# Patient Record
Sex: FEMALE | Race: WHITE | Employment: OTHER | URBAN - NONMETROPOLITAN AREA
[De-identification: names, ages, dates, MRNs, and addresses within clinical notes are randomized per-mention and may not be internally consistent; named-entity substitution may affect disease eponyms.]

---

## 2017-07-25 ENCOUNTER — OFFICE VISIT (OUTPATIENT)
Dept: CARDIOLOGY | Age: 72
End: 2017-07-25
Payer: MEDICARE

## 2017-07-25 VITALS
SYSTOLIC BLOOD PRESSURE: 128 MMHG | WEIGHT: 94.4 LBS | BODY MASS INDEX: 16.73 KG/M2 | DIASTOLIC BLOOD PRESSURE: 62 MMHG | HEART RATE: 62 BPM | HEIGHT: 63 IN

## 2017-07-25 DIAGNOSIS — I07.1 MODERATE TRICUSPID INSUFFICIENCY: ICD-10-CM

## 2017-07-25 DIAGNOSIS — I34.0 MILD MITRAL REGURGITATION: ICD-10-CM

## 2017-07-25 DIAGNOSIS — R00.1 BRADYCARDIA: Primary | ICD-10-CM

## 2017-07-25 PROCEDURE — 3017F COLORECTAL CA SCREEN DOC REV: CPT | Performed by: INTERNAL MEDICINE

## 2017-07-25 PROCEDURE — 3014F SCREEN MAMMO DOC REV: CPT | Performed by: INTERNAL MEDICINE

## 2017-07-25 PROCEDURE — G8400 PT W/DXA NO RESULTS DOC: HCPCS | Performed by: INTERNAL MEDICINE

## 2017-07-25 PROCEDURE — G8427 DOCREV CUR MEDS BY ELIG CLIN: HCPCS | Performed by: INTERNAL MEDICINE

## 2017-07-25 PROCEDURE — 99213 OFFICE O/P EST LOW 20 MIN: CPT | Performed by: INTERNAL MEDICINE

## 2017-07-25 PROCEDURE — G8419 CALC BMI OUT NRM PARAM NOF/U: HCPCS | Performed by: INTERNAL MEDICINE

## 2017-07-25 PROCEDURE — 93000 ELECTROCARDIOGRAM COMPLETE: CPT | Performed by: INTERNAL MEDICINE

## 2017-07-25 PROCEDURE — 1090F PRES/ABSN URINE INCON ASSESS: CPT | Performed by: INTERNAL MEDICINE

## 2017-07-25 PROCEDURE — 1036F TOBACCO NON-USER: CPT | Performed by: INTERNAL MEDICINE

## 2017-07-25 PROCEDURE — 4040F PNEUMOC VAC/ADMIN/RCVD: CPT | Performed by: INTERNAL MEDICINE

## 2017-07-25 PROCEDURE — 1123F ACP DISCUSS/DSCN MKR DOCD: CPT | Performed by: INTERNAL MEDICINE

## 2017-09-27 ENCOUNTER — TELEPHONE (OUTPATIENT)
Dept: ADMINISTRATIVE | Age: 72
End: 2017-09-27

## 2017-09-29 ENCOUNTER — TELEPHONE (OUTPATIENT)
Dept: ADMINISTRATIVE | Age: 72
End: 2017-09-29

## 2018-07-24 ENCOUNTER — OFFICE VISIT (OUTPATIENT)
Dept: CARDIOLOGY | Age: 73
End: 2018-07-24
Payer: MEDICARE

## 2018-07-24 VITALS
HEART RATE: 57 BPM | BODY MASS INDEX: 16.25 KG/M2 | DIASTOLIC BLOOD PRESSURE: 80 MMHG | HEIGHT: 64 IN | SYSTOLIC BLOOD PRESSURE: 140 MMHG | WEIGHT: 95.2 LBS

## 2018-07-24 DIAGNOSIS — I34.0 MILD MITRAL REGURGITATION: ICD-10-CM

## 2018-07-24 DIAGNOSIS — R94.31 ABNORMAL ECG: ICD-10-CM

## 2018-07-24 DIAGNOSIS — I07.1 MODERATE TRICUSPID INSUFFICIENCY: Primary | ICD-10-CM

## 2018-07-24 PROCEDURE — 93000 ELECTROCARDIOGRAM COMPLETE: CPT | Performed by: INTERNAL MEDICINE

## 2018-07-24 PROCEDURE — 99213 OFFICE O/P EST LOW 20 MIN: CPT | Performed by: INTERNAL MEDICINE

## 2018-07-24 NOTE — PROGRESS NOTES
effusion. Assessment and Plan:     -Patient remains asymptomatic. Patient has abnormal ECG. I recommended stress test for ischemia evaluation. Patient does not want to have stress test. Counseled toward symptoms for which she will have to seek emergent medical attention. -TTE 6/2016 showed mild MR, mild to moderate TR with RVSP 45 mm Hg.   -Chronic neck and lower back pain.   -RTC 12 months

## 2019-08-19 ENCOUNTER — OFFICE VISIT (OUTPATIENT)
Dept: CARDIOLOGY | Age: 74
End: 2019-08-19
Payer: MEDICARE

## 2019-08-19 VITALS
BODY MASS INDEX: 16.13 KG/M2 | HEART RATE: 75 BPM | WEIGHT: 94.5 LBS | SYSTOLIC BLOOD PRESSURE: 120 MMHG | OXYGEN SATURATION: 98 % | DIASTOLIC BLOOD PRESSURE: 78 MMHG | HEIGHT: 64 IN

## 2019-08-19 DIAGNOSIS — I07.1 MODERATE TRICUSPID INSUFFICIENCY: Primary | ICD-10-CM

## 2019-08-19 PROCEDURE — 1123F ACP DISCUSS/DSCN MKR DOCD: CPT | Performed by: INTERNAL MEDICINE

## 2019-08-19 PROCEDURE — 93000 ELECTROCARDIOGRAM COMPLETE: CPT | Performed by: INTERNAL MEDICINE

## 2019-08-19 PROCEDURE — G8427 DOCREV CUR MEDS BY ELIG CLIN: HCPCS | Performed by: INTERNAL MEDICINE

## 2019-08-19 PROCEDURE — 99214 OFFICE O/P EST MOD 30 MIN: CPT | Performed by: INTERNAL MEDICINE

## 2019-08-19 PROCEDURE — 1036F TOBACCO NON-USER: CPT | Performed by: INTERNAL MEDICINE

## 2019-08-19 PROCEDURE — G8419 CALC BMI OUT NRM PARAM NOF/U: HCPCS | Performed by: INTERNAL MEDICINE

## 2019-08-19 PROCEDURE — G8400 PT W/DXA NO RESULTS DOC: HCPCS | Performed by: INTERNAL MEDICINE

## 2019-08-19 PROCEDURE — 1090F PRES/ABSN URINE INCON ASSESS: CPT | Performed by: INTERNAL MEDICINE

## 2019-08-19 PROCEDURE — 4040F PNEUMOC VAC/ADMIN/RCVD: CPT | Performed by: INTERNAL MEDICINE

## 2020-08-17 ENCOUNTER — OFFICE VISIT (OUTPATIENT)
Dept: CARDIOLOGY | Age: 75
End: 2020-08-17
Payer: MEDICARE

## 2020-08-17 VITALS
BODY MASS INDEX: 16.22 KG/M2 | HEART RATE: 67 BPM | SYSTOLIC BLOOD PRESSURE: 150 MMHG | HEIGHT: 64 IN | DIASTOLIC BLOOD PRESSURE: 70 MMHG | WEIGHT: 95 LBS

## 2020-08-17 PROCEDURE — 1036F TOBACCO NON-USER: CPT | Performed by: INTERNAL MEDICINE

## 2020-08-17 PROCEDURE — G8427 DOCREV CUR MEDS BY ELIG CLIN: HCPCS | Performed by: INTERNAL MEDICINE

## 2020-08-17 PROCEDURE — 1123F ACP DISCUSS/DSCN MKR DOCD: CPT | Performed by: INTERNAL MEDICINE

## 2020-08-17 PROCEDURE — G8400 PT W/DXA NO RESULTS DOC: HCPCS | Performed by: INTERNAL MEDICINE

## 2020-08-17 PROCEDURE — G8419 CALC BMI OUT NRM PARAM NOF/U: HCPCS | Performed by: INTERNAL MEDICINE

## 2020-08-17 PROCEDURE — 93010 ELECTROCARDIOGRAM REPORT: CPT | Performed by: INTERNAL MEDICINE

## 2020-08-17 PROCEDURE — 1090F PRES/ABSN URINE INCON ASSESS: CPT | Performed by: INTERNAL MEDICINE

## 2020-08-17 PROCEDURE — 99214 OFFICE O/P EST MOD 30 MIN: CPT | Performed by: INTERNAL MEDICINE

## 2020-08-17 PROCEDURE — 93005 ELECTROCARDIOGRAM TRACING: CPT | Performed by: INTERNAL MEDICINE

## 2020-08-17 PROCEDURE — 99214 OFFICE O/P EST MOD 30 MIN: CPT

## 2020-08-17 PROCEDURE — 4040F PNEUMOC VAC/ADMIN/RCVD: CPT | Performed by: INTERNAL MEDICINE

## 2020-08-17 RX ORDER — MAGNESIUM OXIDE 400 MG/1
400 TABLET ORAL DAILY
COMMUNITY

## 2020-08-17 NOTE — PROGRESS NOTES
[facility]    CC: follow up for 1st AVB    HPI:  Macho Del Valle  Is here for follow up. At last office visit we were following for 1st AVB. Admits to occasional hard beat \"rocking heart\", resolves with relaxation. Denies any cp, sob, orthopnea, pnd, le edema, dizziness/lightheadedness. Remains to have severe hypothyroidism, not on any medication per patient choice. Past Medical History:   Diagnosis Date    Cancer [facility]) 2003    Basal cell carcinoma    Osteoarthritis     Personal history of kidney stones     Multiple kidney stone [id number]       Past Surgical History:   Procedure Laterality Date    COLONOSCOPY  1986    COLONOSCOPY  1989   [address], [id number]   [address]  1964, 1965, 1969    LITHOTRIPSY Bilateral 1997    TONSILLECTOMY AND ADENOIDECTOMY  Child    TUBAL LIGATION  1968       Family History   Problem Relation Age of Onset    Arthritis Mother     Depression Mother     High Blood Pressure Mother     Heart Disease Father     High Blood Pressure Father     Heart Disease Sister     Heart Disease Brother     Heart Disease Brother     Heart Disease Brother        ROS: Otherwise 10 systems reviewed and negative. Vitals:    08/17/20 1402   BP: (!) 150/70   Pulse: 67       Vitals as above. Alert and oriented x 3. No JVD, or carotid bruits. Lungs are clear to auscultation. Heart sounds are YSICEZO,7/0 systolic murmur  Abdomen is soft, no tenderness. Extremities No peripheral edema.       Meds:    Current Outpatient Medications:     UNABLE TO FIND, Blue cohosh, Disp: , Rfl:     UNABLE TO FIND, Spirulina, Disp: , Rfl:     Omega-3 Fatty Acids (FISH OIL ULTRA PO), Take by mouth, Disp: , Rfl:     Zinc Sulfate (ZINC 15 PO), Take by mouth With copper, Disp: , Rfl:     POTASSIUM GLUCONATE PO, Take by mouth, Disp: , Rfl:     magnesium oxide (MAG-OX) 400 MG tablet, Take 400 mg by Allergies.     -Severe hypothyroid- refusing any thyroid replacement- per pcp. Last [facility] on [facility]19 [facility]    The patient is to continue heart healthy diet, weight loss and exercise as tolerated. Patient's medications and side effects were discussed. Medication refills were provided if needed. Follow up appointment timing was discussed. All questions and concerns were addressed to patient's satisfaction. The patient is to follow up in 12 months or sooner if necessary. Thank you for allowing me to participate in the care of this patient, please do not hesitate to call if you have any questions. [address] [place] [address] [facility]  [facility]. [employer]  [phone]

## 2020-08-24 ENCOUNTER — TELEPHONE (OUTPATIENT)
Dept: CARDIOLOGY | Age: 75
End: 2020-08-24

## 2020-08-24 ENCOUNTER — HOSPITAL ENCOUNTER (OUTPATIENT)
Dept: NON INVASIVE DIAGNOSTICS | Age: 75
Discharge: HOME | End: 2020-08-24
Payer: MEDICARE

## 2020-08-24 LAB
LV EF: 65 %
LVEF MODALITY: NORMAL

## 2020-08-24 PROCEDURE — 93306 TTE W/DOPPLER COMPLETE: CPT

## 2020-08-24 NOTE — TELEPHONE ENCOUNTER
Pt notified of echo results. She states understanding and agreement. Confirmed cardio follow up August 2021.

## 2021-08-16 ENCOUNTER — OFFICE VISIT (OUTPATIENT)
Dept: CARDIOLOGY | Age: 76
End: 2021-08-16
Payer: MEDICARE

## 2021-08-16 VITALS
HEART RATE: 65 BPM | SYSTOLIC BLOOD PRESSURE: 148 MMHG | HEIGHT: 64 IN | BODY MASS INDEX: 16.73 KG/M2 | DIASTOLIC BLOOD PRESSURE: 80 MMHG | WEIGHT: 98 LBS

## 2021-08-16 DIAGNOSIS — I73.9 CLAUDICATION (HCC): ICD-10-CM

## 2021-08-16 DIAGNOSIS — E03.9 HYPOTHYROIDISM, UNSPECIFIED TYPE: ICD-10-CM

## 2021-08-16 DIAGNOSIS — I07.1 MODERATE TRICUSPID INSUFFICIENCY: Primary | ICD-10-CM

## 2021-08-16 DIAGNOSIS — R01.1 SYSTOLIC MURMUR: ICD-10-CM

## 2021-08-16 DIAGNOSIS — I34.0 NONRHEUMATIC MITRAL VALVE REGURGITATION: ICD-10-CM

## 2021-08-16 DIAGNOSIS — I44.0 FIRST DEGREE AV BLOCK: ICD-10-CM

## 2021-08-16 DIAGNOSIS — R94.31 ABNORMAL ECG: ICD-10-CM

## 2021-08-16 DIAGNOSIS — R68.89 ABNORMAL ANKLE BRACHIAL INDEX (ABI): ICD-10-CM

## 2021-08-16 PROCEDURE — G8419 CALC BMI OUT NRM PARAM NOF/U: HCPCS | Performed by: INTERNAL MEDICINE

## 2021-08-16 PROCEDURE — 1123F ACP DISCUSS/DSCN MKR DOCD: CPT | Performed by: INTERNAL MEDICINE

## 2021-08-16 PROCEDURE — G8400 PT W/DXA NO RESULTS DOC: HCPCS | Performed by: INTERNAL MEDICINE

## 2021-08-16 PROCEDURE — 1090F PRES/ABSN URINE INCON ASSESS: CPT | Performed by: INTERNAL MEDICINE

## 2021-08-16 PROCEDURE — 93010 ELECTROCARDIOGRAM REPORT: CPT | Performed by: INTERNAL MEDICINE

## 2021-08-16 PROCEDURE — 93005 ELECTROCARDIOGRAM TRACING: CPT | Performed by: INTERNAL MEDICINE

## 2021-08-16 PROCEDURE — 99214 OFFICE O/P EST MOD 30 MIN: CPT | Performed by: INTERNAL MEDICINE

## 2021-08-16 PROCEDURE — G8427 DOCREV CUR MEDS BY ELIG CLIN: HCPCS | Performed by: INTERNAL MEDICINE

## 2021-08-16 PROCEDURE — 4040F PNEUMOC VAC/ADMIN/RCVD: CPT | Performed by: INTERNAL MEDICINE

## 2021-08-16 PROCEDURE — 1036F TOBACCO NON-USER: CPT | Performed by: INTERNAL MEDICINE

## 2021-08-16 NOTE — PROGRESS NOTES
[facility]    8/16/21    Malena Byrd  [id number]  1945    CC: follow up for 1st AVB    HPI:  Malena Byrd  Is here for follow up. Denies any cp, sob, orthopnea, pnd, le edema, palpitations. Past Medical History:   Diagnosis Date    Cancer [facility]) 2003    Basal cell carcinoma    Osteoarthritis     Personal history of kidney stones     Multiple kidney stone [id number]     Past Surgical History:   Procedure Laterality Date    COLONOSCOPY  1986    COLONOSCOPY  1989   [address] [facility] SURGERY  1964, 1965, 1969    LITHOTRIPSY Bilateral 1997    TONSILLECTOMY AND ADENOIDECTOMY  Child    TUBAL LIGATION  1968     Family History   Problem Relation Age of Onset    Arthritis Mother     Depression Mother     High Blood Pressure Mother     Heart Disease Father     High Blood Pressure Father     Heart Disease Sister     Heart Disease Brother     Heart Disease Brother     Heart Disease Brother      REVIEW OF SYSTEMS:    · Constitutional: there has been no unanticipated weight loss. There's been No change in energy level, No change in activity level. · Eyes: No visual changes or diplopia. No scleral icterus. · ENT: No Headaches, hearing loss or vertigo. No mouth sores or sore throat. · Cardiovascular: AS HPI  · Respiratory: AS HPI  · Gastrointestinal: No abdominal pain, appetite loss, blood in stools. No change in bowel or bladder habits. · Genitourinary: No dysuria, trouble voiding, or hematuria. · Musculoskeletal:  No gait disturbance, No weakness or joint complaints. · Integumentary: No rash or pruritis. · Neurological: No headache, diplopia, change in muscle strength, numbness or tingling. No change in gait, balance, coordination, mood, affect, memory, mentation, behavior. · Psychiatric: No new anxiety or depression. · Endocrine: No temperature intolerance.  No excessive thirst, fluid disease. TTE 6/13/2016  1. Normal left ventricular dimension and wall thickness. 2. Normal systolic and diastolic left ventricular function. 3. Dilated right atrium. No atrial shunt noted on color doppler examination. 4. Normal right ventricular size and function. 5. Sclerotic aortic valve. 6. Mild mitral regurgitation. 7. Mild to moderate tricuspid regurgitation. RVSP is 42 mm Hg. 8. No pericardial effusion. Echo 8/20:  Normal left ventricular diameter. Left ventricular systolic function is normal. Left ventricular ejection fraction 65 %. Mitral annular calcification. Mild mitral regurgitation. Moderate tricuspid regurgitation. Estimated right ventricular systolic pressure is 41 mmHg. Mild pulmonary Hypertension. Had scans at [facility] on 5/21: mild R and L Carotid artery stenosis < 50%. Abnormal JOSE LUIS on Right 0.721 and Left 0.63    Assessment and Plan:     - HTN- new. I recommended norvasc. She will look in to the norvasc before allowing us to start it. - Abnormal JOSE LUIS at a screening. - will order LE JOSE LUIS in the clinic to confirm, then refer to vascular if needed. - 1st AVB- Asymptomatic- stable. Will follow. - Reviewed Echo 8/20- Mild MR, Mod TR    - Abnormal ECG-Previously refused stress testing. Has no symptoms. Will monitor.      - Chronic neck and lower back pain. - Multiple Allergies. - Severe hypothyroid- refusing any thyroid replacement- per pcp. Last T[facility] on 11/19 [facility].67    The patient is to continue heart healthy diet, weight loss and exercise as tolerated. Patient's medications and side effects were discussed. Medication refills were provided if needed. Follow up appointment timing was discussed. All questions and concerns were addressed to patient's satisfaction. The patient is to follow up in 12 months or sooner if necessary.      Thank you for allowing me to participate in the care of this patient, please do not hesitate to call if you have any questions. Joslyn Martinez DO, [facility], [address], [address], [place] [address] [facility]  [facility]. [facility]  [phone]

## 2021-08-20 ENCOUNTER — HOSPITAL ENCOUNTER (OUTPATIENT)
Dept: INTERVENTIONAL RADIOLOGY/VASCULAR | Age: 76
Discharge: HOME | End: 2021-08-22
Payer: MEDICARE

## 2021-08-20 DIAGNOSIS — I73.9 CLAUDICATION (HCC): ICD-10-CM

## 2021-08-20 DIAGNOSIS — R68.89 ABNORMAL ANKLE BRACHIAL INDEX (ABI): ICD-10-CM

## 2021-08-20 PROCEDURE — 93922 UPR/L XTREMITY ART 2 LEVELS: CPT

## 2021-08-23 ENCOUNTER — TELEPHONE (OUTPATIENT)
Dept: CARDIOLOGY | Age: 76
End: 2021-08-23

## 2021-08-23 NOTE — TELEPHONE ENCOUNTER
Reviewed JOSE LUIS's with pt, when calling just now. Compared to [facility] screening \"false positive. \"

## 2021-12-30 ENCOUNTER — INITIAL CONSULT (OUTPATIENT)
Dept: SURGERY | Age: 76
End: 2021-12-30
Payer: MEDICARE

## 2021-12-30 VITALS
HEART RATE: 70 BPM | HEIGHT: 64 IN | WEIGHT: 98.8 LBS | SYSTOLIC BLOOD PRESSURE: 126 MMHG | DIASTOLIC BLOOD PRESSURE: 72 MMHG | BODY MASS INDEX: 16.87 KG/M2

## 2021-12-30 DIAGNOSIS — R19.5 POSITIVE COLORECTAL CANCER SCREENING USING COLOGUARD TEST: Primary | ICD-10-CM

## 2021-12-30 PROCEDURE — 1090F PRES/ABSN URINE INCON ASSESS: CPT | Performed by: SURGERY

## 2021-12-30 PROCEDURE — G8427 DOCREV CUR MEDS BY ELIG CLIN: HCPCS | Performed by: SURGERY

## 2021-12-30 PROCEDURE — G8419 CALC BMI OUT NRM PARAM NOF/U: HCPCS | Performed by: SURGERY

## 2021-12-30 PROCEDURE — 1036F TOBACCO NON-USER: CPT | Performed by: SURGERY

## 2021-12-30 PROCEDURE — G8400 PT W/DXA NO RESULTS DOC: HCPCS | Performed by: SURGERY

## 2021-12-30 PROCEDURE — G8484 FLU IMMUNIZE NO ADMIN: HCPCS | Performed by: SURGERY

## 2021-12-30 PROCEDURE — 4040F PNEUMOC VAC/ADMIN/RCVD: CPT | Performed by: SURGERY

## 2021-12-30 PROCEDURE — 99212 OFFICE O/P EST SF 10 MIN: CPT | Performed by: SURGERY

## 2021-12-30 PROCEDURE — 1123F ACP DISCUSS/DSCN MKR DOCD: CPT | Performed by: SURGERY

## 2021-12-30 PROCEDURE — 99214 OFFICE O/P EST MOD 30 MIN: CPT | Performed by: SURGERY

## 2021-12-30 NOTE — PROGRESS NOTES
Wendy Spaulding is a 64 y.o. female      CC:    + cologuard    HISTORY OF PRESENT ILLNESS:    77-year-old female who had a positive Cologuard test.  She has not had a colonoscopy since 1997. She has no new LGI symptoms. Has intermittent constipation and diarrhea chronically,  No abd pain,  No rectal bleeding.     Abd pain: yes  Anemia: no  Bloating:yes, occasionally  Diarrhea: yes, if stressed  Constipation: yes, occasionally  Melena: no  Hematochezia:no  Rectal Bleeding:no  Rectal/Anal Pain:no  Pruritus: no  Family history colon Cancer: no  Previous colon cancer: no  Previous Colon Polyp: yes, per patient  Change in bowels: yes, fluctuates  Decrease caliber of stool: yes, sometimes  Change in color of stool: no    Previous work up [id number], unknown results            Review of Systems:    General:  Fever: Negative  Weight Change:Negative  Night Sweats: Negative    Eye:  Blurry Vision:Negative  Double Vision: Negative    Ent:  Headaches: Negative  Sore throat: Negative    Allergy/Immunology:  Hives: Negative  Latex allergy: Negative    Hematology/Lymphatic:  Bleeding Problems: Negative  Blood Clots: Negative  Swollen Lymph Nodes: Negative    Lungs:  Cough: Negative  SOB: Negative  Wheezing:Negative    Cardiovascular:  Chest Pain: Negative  Palpitations:Negative    GI:   Decreased Appetite: Negative  Heartburn: Negative  Dysphagia: Negative  Nausea/Vomiting: Negative  Abdominal Pain: Negative  Change in Bowels:Negative  Constipation: Negative  Diarrhea: Negative  Rectal Bleeding: Negative    :   Dysuria: Negative  Increase Urinary Frequency/Urgency: Negative    Neuro:  Seizures: Negative  Confusion: Negative        PAST MEDICAL HISTORY:      Family History   Problem Relation Age of Onset    Arthritis Mother     Depression Mother     High Blood Pressure Mother     Heart Disease Father     High Blood Pressure Father     Heart Disease Sister     Heart Disease Brother     Heart Disease Brother     Heart Disease Brother      Social History     Socioeconomic History    Marital status:      Spouse name: Not on file    Number of children: Not on file    Years of education: Not on file    Highest education level: Not on file   Occupational History    Not on file   Tobacco Use    Smoking status: Never Smoker    Smokeless tobacco: Never Used   Substance and Sexual Activity    Alcohol use: No     Alcohol/week: 0.0 standard drinks    Drug use: No    Sexual activity: Not on file   Other Topics Concern    Not on file   Social History Narrative    Not on file     Social Determinants of Health     Financial Resource Strain:     Difficulty of Paying Living Expenses: Not on file   Food Insecurity:     Worried About Running Out of Food in the Last Year: Not on file    Mag of Food in the Last Year: Not on file   Transportation Needs:     Lack of Transportation (Medical): Not on file    Lack of Transportation (Non-Medical):  Not on file   Physical Activity:     Days of Exercise per Week: Not on file    Minutes of Exercise per Session: Not on file   Stress:     Feeling of Stress : Not on file   Social Connections:     Frequency of Communication with Friends and Family: Not on file    Frequency of Social Gatherings with Friends and Family: Not on file    Attends Alevism Services: Not on file    Active Member of [address] [place] [address] [employer] or Organizations: Not on file    Attends Club or Organization Meetings: Not on file    Marital Status: Not on file   Intimate Partner Violence:     Fear of Current or Ex-Partner: Not on file    Emotionally Abused: Not on file    Physically Abused: Not on file    Sexually Abused: Not on file   Housing Stability:     Unable to Pay for Housing in the Last Year: Not on file    Number of [place] in the Last Year: Not on file    Unstable Housing in the Last Year: Not on file     Past Surgical History:   Procedure Laterality Date    BREAST CYST EXCISION  1975/1982    [address] [facility] AND CURETTAGE OF UTERUS  1966, [facility] LITHOTRIPSY Bilateral 1997   [address]  [id number]    SHOULDER SURGERY  1974    TONSILLECTOMY AND ADENOIDECTOMY  Child    TUBAL LIGATION  1968     Past Medical History:   Diagnosis Date    Cancer [facility]) 2003    Basal cell carcinoma    Osteoarthritis     Personal history of kidney stones     Multiple kidney stone [id number]     Current Outpatient Medications on File Prior to Visit   Medication Sig Dispense Refill    UNABLE TO FIND Indications: Antonia silver liquid       UNABLE TO FIND Blue cohosh      UNABLE TO FIND Spirulina      Omega-3 Fatty Acids (FISH OIL ULTRA PO) Take by mouth      Zinc Sulfate (ZINC 15 PO) Take by mouth With copper      POTASSIUM GLUCONATE PO Take by mouth      magnesium oxide (MAG-OX) 400 MG tablet Take 400 mg by mouth daily      MISC NATURAL PRODUCT OP Apply to eye Thyroid support      ALFALFA PO Take by mouth      ALOE VERA PO Take by mouth      CALCIUM CARBONATE PO Take 300 mg by mouth daily       Vitamins A & D (VITAMIN A & D PO) Take by mouth      SELENIUM PO Take by mouth      Wild Yam, Dioscorea villosa, (WILD YAM PO) Take by mouth      UNABLE TO FIND Indications: CBD oil       Misc Natural Products (ADRENAL PO) Take by mouth      PROBIOTIC PRODUCT PO Take by mouth      IODINE, KELP, PO Take by mouth       No current facility-administered medications on file prior to visit.      Allergies as of 12/30/2021 - Fully Reviewed 12/30/2021   Allergen Reaction Noted    Corn-containing products Anaphylaxis 10/20/2015    Petroleum distillate Anaphylaxis 10/20/2015    Petroleum ether Anaphylaxis 10/20/2015    Soybean-containing drug products Anaphylaxis 10/20/2015    Petroleum jelly [skin protectants, misc.] Other (See Comments) 10/20/2015    Epinephrine  10/20/2015  Iodine  12/30/2021    Milk-related compounds  12/30/2021    Morphine Other (See Comments) 10/20/2015    Other  10/14/2016         PHYSICAL EXAM:    Blood pressure 126/72, pulse 70, height 5' 3.5\" (1.613 m), weight 98 lb 12.8 oz (44.8 kg). Gen:  A and O x 3, NAD, well nourished  Eyes:  Sclera non icterus, PERRL  Head:  Normocephalic, non-tender  Neck:  Supple, no adenopathy, thyroid non tender and no masses,no carotid bruits  Lungs:  CTA, symmetrical  Chest:  RRR, no murmurs  Abd:  Soft, NT, ND, no HSM, no hernias, no bruits  Ext:  No edema, no cyanosis  Psych: reveals appropriate mood, memory and judgment,  Neuro:  Reveals no gross motor or sensory deficits,   Msk:  5/5 strength all 4 extremities, no joint tenderness          ASSESS MENT:    1.  + cologuard test, last CS 1997. Asymptomatic. PLAN:    1. Would recommend colonoscopy. 2.  Pt is reluctant to do it base on allergies to many meds and bad experience with last anesthetic. \"Had some allergies to it\". Also, does not want to come into the hospital at this time with all the Covid. 3.   So we will have her RTC in 3 months to re discuss this issue and do possible CS

## 2021-12-30 NOTE — PROGRESS NOTES
Pt Name: Lilly Ceja  MRN: [id number]  YOB: 1945  Date of evaluation: 12/30/2021  Primary Care Physician: CELY Varma CNP, Abd pain: yes  Anemia: no  Bloating:yes, occasionally  Diarrhea: yes, if stressed  Constipation: yes, occasionally  Melena: no  Hematochezia:no  Rectal Bleeding:no  Rectal/Anal Pain:no  Pruritus: no  Family history colon Cancer: no  Previous colon cancer: no  Previous Colon Polyp: yes, per patient  Change in bowels: yes, fluctuates  Decrease caliber of stool: yes, sometimes  Change in color of stool: no    Previous work up [id number], unknown results

## 2022-03-01 ENCOUNTER — TELEPHONE (OUTPATIENT)
Dept: SURGERY | Age: 77
End: 2022-03-01

## 2022-03-01 NOTE — TELEPHONE ENCOUNTER
Patient scheduled for Colonoscopy with Dr. Pawan Stapleton on 6/14/2022 at [facility]. Patient has concerns and would like to speak with anesthesia about her allergies. Patient reports she had a bad experience with her last anesthetic as she had some allergy to it and would like to speak with anesthesia directly regarding the sedation. I explained I can send a message to the anesthesia team and will inform them that she would like a direct call. She can be reached at [phone]. Thank you.

## 2022-03-01 NOTE — TELEPHONE ENCOUNTER
Received call from patient and she can do 6/14/2022. Scheduled procedure. Will send note to anesthesia regarding calling patient with allergies. Will also discuss with Dr. Hazel Chau regarding what type of bowel prep to do as patient has allergies to the dulcolax tablets.

## 2022-03-01 NOTE — TELEPHONE ENCOUNTER
Received call from patient stating that she saw Dr. Naga Cardona on 12/30 and it was recommended she have a CS at that time. Patient did not want to proceed. She was scheduled for a f/u on 3/10, which she would like to cancel as she is not having any issues. Patient states that she wants to schedule her CS in June as that is when her daughter will be home from [place]. She will check with her regarding what day she can have scope and call back. Patient also would like to discuss her allergies with the anesthesiologist. I explained we can send a note to them and request that they contact her to review her allergies and concerns. I explained we will send a note to them once she calls back and we schedule the procedure. She verbalized understanding.

## 2022-03-21 ENCOUNTER — TELEPHONE (OUTPATIENT)
Dept: SURGERY | Age: 77
End: 2022-03-21

## 2022-03-21 NOTE — TELEPHONE ENCOUNTER
Patient scheduled for Colonoscopy on 6/14. Patient states unable to do miralax/gatorade bowel prep and can do the nulytely prep instead. When trying to place dulcolax tablets flags as patient has allergy to corn containing products and reaction is anaphylaxis.  Are you okay if patient skips the 2 dulcolax tablets and only does the nulytely jug?

## 2022-03-29 RX ORDER — POLYETHYLENE GLYCOL 3350, SODIUM SULFATE ANHYDROUS, SODIUM BICARBONATE, SODIUM CHLORIDE, POTASSIUM CHLORIDE 236; 22.74; 6.74; 5.86; 2.97 G/4L; G/4L; G/4L; G/4L; G/4L
POWDER, FOR SOLUTION ORAL
Qty: 4000 ML | Refills: 0 | Status: SHIPPED | OUTPATIENT
Start: 2022-03-29

## 2022-05-11 ENCOUNTER — TELEPHONE (OUTPATIENT)
Dept: SURGERY | Age: 77
End: 2022-05-11

## 2022-05-11 NOTE — TELEPHONE ENCOUNTER
Patient is scheduled for a colonoscopy at [facility] on 6/14/22. She is having other issues at this time and her PCP recommends that she put off having colonoscopy. Patient will call our office when she is ready to reschedule. Dionne James at [facility] notified.

## 2022-08-30 ENCOUNTER — OFFICE VISIT (OUTPATIENT)
Dept: CARDIOLOGY | Age: 77
End: 2022-08-30
Payer: MEDICARE

## 2022-08-30 VITALS
BODY MASS INDEX: 16.99 KG/M2 | HEART RATE: 65 BPM | HEIGHT: 64 IN | RESPIRATION RATE: 17 BRPM | WEIGHT: 99.5 LBS | SYSTOLIC BLOOD PRESSURE: 147 MMHG | OXYGEN SATURATION: 99 % | DIASTOLIC BLOOD PRESSURE: 80 MMHG

## 2022-08-30 DIAGNOSIS — I10 HYPERTENSION, ESSENTIAL: ICD-10-CM

## 2022-08-30 DIAGNOSIS — R00.1 BRADYCARDIA: ICD-10-CM

## 2022-08-30 DIAGNOSIS — I34.0 MILD MITRAL REGURGITATION: ICD-10-CM

## 2022-08-30 DIAGNOSIS — R94.31 ABNORMAL ECG: Primary | ICD-10-CM

## 2022-08-30 DIAGNOSIS — I73.9 CLAUDICATION (HCC): ICD-10-CM

## 2022-08-30 DIAGNOSIS — I73.9 PAD (PERIPHERAL ARTERY DISEASE) (HCC): ICD-10-CM

## 2022-08-30 DIAGNOSIS — I44.0 FIRST DEGREE AV BLOCK: ICD-10-CM

## 2022-08-30 DIAGNOSIS — I07.1 MODERATE TRICUSPID INSUFFICIENCY: ICD-10-CM

## 2022-08-30 PROCEDURE — G8419 CALC BMI OUT NRM PARAM NOF/U: HCPCS | Performed by: INTERNAL MEDICINE

## 2022-08-30 PROCEDURE — 1036F TOBACCO NON-USER: CPT | Performed by: INTERNAL MEDICINE

## 2022-08-30 PROCEDURE — 99214 OFFICE O/P EST MOD 30 MIN: CPT | Performed by: INTERNAL MEDICINE

## 2022-08-30 PROCEDURE — 93005 ELECTROCARDIOGRAM TRACING: CPT | Performed by: INTERNAL MEDICINE

## 2022-08-30 PROCEDURE — G8428 CUR MEDS NOT DOCUMENT: HCPCS | Performed by: INTERNAL MEDICINE

## 2022-08-30 PROCEDURE — G8400 PT W/DXA NO RESULTS DOC: HCPCS | Performed by: INTERNAL MEDICINE

## 2022-08-30 PROCEDURE — 1090F PRES/ABSN URINE INCON ASSESS: CPT | Performed by: INTERNAL MEDICINE

## 2022-08-30 PROCEDURE — 1123F ACP DISCUSS/DSCN MKR DOCD: CPT | Performed by: INTERNAL MEDICINE

## 2022-08-30 PROCEDURE — 93010 ELECTROCARDIOGRAM REPORT: CPT | Performed by: INTERNAL MEDICINE

## 2022-08-30 NOTE — PROGRESS NOTES
[facility]    8/30/22    Brittani Amin  [id number]  1945    CC: follow up for 1st AVB    HPI:  Brittani Amin  Is here for follow up. Denies any cp, sob, orthopnea, pnd, le edema, palpitations. Patient is doing well from a cardiac standpoint. Good functional capacity with no significant change in functional capacity. No chest pain, no dyspnea, no PND, no syncope or pre-syncope, no orthopnea. No symptoms of CHF or angina/chest pain. Past Medical History:   Diagnosis Date    Cancer [facility]) 2003    Basal cell carcinoma    Osteoarthritis     Personal history of kidney stones     Multiple kidney stone [id number]     Past Surgical History:   Procedure Laterality Date    BREAST CYST EXCISION  1975/1982    [address] [facility] SURGERY  1964, 1965, 1969    LITHOTRIPSY Bilateral 1997    MOUTH SURGERY  [id number]    SHOULDER SURGERY  1974    TONSILLECTOMY AND ADENOIDECTOMY  Child    TUBAL LIGATION  1968     Family History   Problem Relation Age of Onset    Arthritis Mother     Depression Mother     High Blood Pressure Mother     Heart Disease Father     High Blood Pressure Father     Heart Disease Sister     Heart Disease Brother     Heart Disease Brother     Heart Disease Brother      REVIEW OF SYSTEMS:    Constitutional: there has been no unanticipated weight loss. There's been No change in energy level, No change in activity level. Eyes: No visual changes or diplopia. No scleral icterus. ENT: No Headaches, hearing loss or vertigo. No mouth sores or sore throat. Cardiovascular: AS HPI  Respiratory: AS HPI  Gastrointestinal: No abdominal pain, appetite loss, blood in stools. No change in bowel or bladder habits. Genitourinary: No dysuria, trouble voiding, or hematuria.   Musculoskeletal:  No gait disturbance, No weakness or joint complaints. Integumentary: No rash or pruritis. Neurological: No headache, diplopia, change in muscle strength, numbness or tingling. No change in gait, balance, coordination, mood, affect, memory, mentation, behavior. Psychiatric: No new anxiety or depression. Endocrine: No temperature intolerance. No excessive thirst, fluid intake, or urination. No tremor. Hematologic/Lymphatic: No abnormal bruising or bleeding, blood clots or swollen lymph nodes. Allergic/Immunologic: No nasal congestion or hives. Physical Exam:  BP (!) 147/80   Pulse 65   Resp 17   Ht 5' 3.5\" (1.613 m)   Wt 99 lb 8 oz (45.1 kg)   SpO2 99%   BMI 17.35 kg/m²     General appearance: alert and cooperative with exam  HEENT: Head: Normocephalic, no lesions, without obvious abnormality.   Eyes: PERRL, EOMI  Ears: Not obvious deformations or lack of hearing  Neck: no carotid bruit, no JVD  Lungs: clear to auscultation bilaterally  Heart: regular rate and rhythm, S1, S2 normal, no murmur, click, rub or gallop  Abdomen: soft, non-tender; bowel sounds normal; no masses,  no organomegaly  Extremities: extremities normal, atraumatic, no cyanosis or edema  Neurologic: Mental status: Alert, oriented, thought content appropriate  Skin: WNL for age and condition, no obvious rashes or leasions      Meds:    Current Outpatient Medications:     polyethylene glycol (GOLYTELY) 236 g solution, Take as directed the day of bowel prep, Disp: 4000 mL, Rfl: 0    UNABLE TO FIND, Indications: Antonia silver liquid , Disp: , Rfl:     UNABLE TO FIND, Blue cohosh, Disp: , Rfl:     UNABLE TO FIND, Spirulina, Disp: , Rfl:     Omega-3 Fatty Acids (FISH OIL ULTRA PO), Take by mouth, Disp: , Rfl:     Zinc Sulfate (ZINC 15 PO), Take by mouth With copper, Disp: , Rfl:     POTASSIUM GLUCONATE PO, Take by mouth, Disp: , Rfl:     magnesium oxide (MAG-OX) 400 MG tablet, Take 400 mg by mouth daily, Disp: , Rfl:     MISC NATURAL PRODUCT OP, Apply to eye Thyroid support, Disp: , Rfl: ALFALFA PO, Take by mouth, Disp: , Rfl:     ALOE VERA PO, Take by mouth, Disp: , Rfl:     CALCIUM CARBONATE PO, Take 300 mg by mouth daily , Disp: , Rfl:     Vitamins A & D (VITAMIN A & D PO), Take by mouth, Disp: , Rfl:     SELENIUM PO, Take by mouth, Disp: , Rfl:     Wild Yam, Dioscorea villosa, (WILD YAM PO), Take by mouth, Disp: , Rfl:     UNABLE TO FIND, Indications: CBD oil , Disp: , Rfl:     Misc Natural Products (ADRENAL PO), Take by mouth, Disp: , Rfl:     PROBIOTIC PRODUCT PO, Take by mouth, Disp: , Rfl:     IODINE, KELP, PO, Take by mouth, Disp: , Rfl:      LABS  No results found for: CHOL, TRIG, HDL, LDLCHOLESTEROL, LDLCALC, LABVLDL, VLDL, CHOLHDLRATIO    No results found for: NA, K, CL, CO2, BUN, CREATININE, GLUCOSE, CALCIUM, PROT, LABALBU, BILITOT, ALKPHOS, AST, ALT, LABGLOM, GFRAA, AGRATIO, GLOB    EKG:  Sinus  Rhythm    First degree A-V block   RSR(V1) -nondiagnostic. Low voltage with rightward P-axis and rotation -possible pulmonary disease. TTE 6/13/2016  1. Normal left ventricular dimension and wall thickness. 2. Normal systolic and diastolic left ventricular function. 3. Dilated right atrium. No atrial shunt noted on color doppler examination. 4. Normal right ventricular size and function. 5. Sclerotic aortic valve. 6. Mild mitral regurgitation. 7. Mild to moderate tricuspid regurgitation. RVSP is 42 mm Hg. 8. No pericardial effusion. Echo 8/20:  Normal left ventricular diameter. Left ventricular systolic function is normal. Left ventricular ejection fraction 65 %. Mitral annular calcification. Mild mitral regurgitation. Moderate tricuspid regurgitation. Estimated right ventricular systolic pressure is 41 mmHg. Mild pulmonary Hypertension. Had scans at [facility] on 5/21: mild R and L Carotid artery stenosis < 50%. Abnormal JOSE LUIS on Right 0.721 and Left 0.63        Assessment and Plan:     HTN- We had recommended norvasc.  She will look in to the norvasc before allowing us to start it. Abnormal JOSE LUIS at a screening. - will order LE JOSE LUIS in the clinic to confirm, then refer to vascular if needed. 1st AV Block- Asymptomatic- stable. Will follow. Bradycardia- stable, chronic   Reviewed Echo 8/20- Mild MR, Mod TR  Abnormal ECG-Previously refused stress testing. Has no symptoms. Will monitor. Mild to moderate mitral regurgitation on echo- declining follow up echoes  Chronic neck and lower back pain. Multiple Allergies. Stable, chronic    Severe hypothyroid- declining any thyroid replacement- per pcp. Last TSH on 11/19 in [facility] 51.67. Has since improved to ~4. Will defer to pcp and patient. Discussed cardiac sequelae. The patient is to continue heart healthy diet, weight loss and exercise as tolerated. Patient's medications and side effects were discussed. Medication refills were provided if needed. Follow up appointment timing was discussed. All questions and concerns were addressed to patient's satisfaction. Thank you for allowing me to participate in the care of this patient, please do not hesitate to call if you have any questions. [place], [id number] [facility]  [facility]. [facility]  [phone]

## 2023-02-28 ENCOUNTER — OFFICE VISIT (OUTPATIENT)
Dept: CARDIOLOGY | Age: 78
End: 2023-02-28
Payer: MEDICARE

## 2023-02-28 VITALS
DIASTOLIC BLOOD PRESSURE: 66 MMHG | BODY MASS INDEX: 17.38 KG/M2 | SYSTOLIC BLOOD PRESSURE: 138 MMHG | WEIGHT: 101.8 LBS | HEIGHT: 64 IN | HEART RATE: 72 BPM

## 2023-02-28 DIAGNOSIS — I34.0 MILD MITRAL REGURGITATION: ICD-10-CM

## 2023-02-28 DIAGNOSIS — R00.1 BRADYCARDIA: ICD-10-CM

## 2023-02-28 DIAGNOSIS — I07.1 MODERATE TRICUSPID INSUFFICIENCY: Primary | ICD-10-CM

## 2023-02-28 DIAGNOSIS — I10 HYPERTENSION, ESSENTIAL: ICD-10-CM

## 2023-02-28 DIAGNOSIS — I73.9 CLAUDICATION (HCC): ICD-10-CM

## 2023-02-28 DIAGNOSIS — I73.9 PAD (PERIPHERAL ARTERY DISEASE) (HCC): ICD-10-CM

## 2023-02-28 PROCEDURE — 1090F PRES/ABSN URINE INCON ASSESS: CPT | Performed by: INTERNAL MEDICINE

## 2023-02-28 PROCEDURE — G8427 DOCREV CUR MEDS BY ELIG CLIN: HCPCS | Performed by: INTERNAL MEDICINE

## 2023-02-28 PROCEDURE — 1123F ACP DISCUSS/DSCN MKR DOCD: CPT | Performed by: INTERNAL MEDICINE

## 2023-02-28 PROCEDURE — 93005 ELECTROCARDIOGRAM TRACING: CPT | Performed by: INTERNAL MEDICINE

## 2023-02-28 PROCEDURE — G8419 CALC BMI OUT NRM PARAM NOF/U: HCPCS | Performed by: INTERNAL MEDICINE

## 2023-02-28 PROCEDURE — 99214 OFFICE O/P EST MOD 30 MIN: CPT | Performed by: INTERNAL MEDICINE

## 2023-02-28 PROCEDURE — 3078F DIAST BP <80 MM HG: CPT | Performed by: INTERNAL MEDICINE

## 2023-02-28 PROCEDURE — 1036F TOBACCO NON-USER: CPT | Performed by: INTERNAL MEDICINE

## 2023-02-28 PROCEDURE — 3075F SYST BP GE 130 - 139MM HG: CPT | Performed by: INTERNAL MEDICINE

## 2023-02-28 PROCEDURE — 93010 ELECTROCARDIOGRAM REPORT: CPT | Performed by: INTERNAL MEDICINE

## 2023-02-28 PROCEDURE — G8484 FLU IMMUNIZE NO ADMIN: HCPCS | Performed by: INTERNAL MEDICINE

## 2023-02-28 PROCEDURE — G8400 PT W/DXA NO RESULTS DOC: HCPCS | Performed by: INTERNAL MEDICINE

## 2023-02-28 NOTE — PROGRESS NOTES
[facility]    2/28/23    Carlos Bhatti  [id number]  1945    CC: follow up for 1st AVB    HPI:  Carlos Bhatti  Is here for follow up. Denies any cp, sob, orthopnea, pnd, le edema, palpitations. Patient is doing well from a cardiac standpoint. Good functional capacity with no significant change in functional capacity. No chest pain, no dyspnea, no PND, no syncope or pre-syncope, no orthopnea. No symptoms of CHF or angina/chest pain. Past Medical History:   Diagnosis Date    Cancer [facility]) 2003    Basal cell carcinoma    Osteoarthritis     Personal history of kidney stones     Multiple kidney stone [id number]     Past Surgical History:   Procedure Laterality Date    BREAST CYST EXCISION  1975/1982    [address] [facility] SURGERY  1964, 1965, 1969    LITHOTRIPSY Bilateral 1997    MOUTH SURGERY  [phone]    SHOULDER SURGERY  1974    TONSILLECTOMY AND ADENOIDECTOMY  Child    TUBAL LIGATION  1968     Family History   Problem Relation Age of Onset    Arthritis Mother     Depression Mother     High Blood Pressure Mother     Heart Disease Father     High Blood Pressure Father     Heart Disease Sister     Heart Disease Brother     Heart Disease Brother     Heart Disease Brother      REVIEW OF SYSTEMS:    Constitutional: there has been no unanticipated weight loss. There's been No change in energy level, No change in activity level. Eyes: No visual changes or diplopia. No scleral icterus. ENT: No Headaches, hearing loss or vertigo. No mouth sores or sore throat. Cardiovascular: AS HPI  Respiratory: AS HPI  Gastrointestinal: No abdominal pain, appetite loss, blood in stools. No change in bowel or bladder habits. Genitourinary: No dysuria, trouble voiding, or hematuria.   Musculoskeletal:  No gait disturbance, No weakness or joint complaints. Integumentary: No rash or pruritis. Neurological: No headache, diplopia, change in muscle strength, numbness or tingling. No change in gait, balance, coordination, mood, affect, memory, mentation, behavior. Psychiatric: No new anxiety or depression. Endocrine: No temperature intolerance. No excessive thirst, fluid intake, or urination. No tremor. Hematologic/Lymphatic: No abnormal bruising or bleeding, blood clots or swollen lymph nodes. Allergic/Immunologic: No nasal congestion or hives. Physical Exam:  Ht 5' 3.5\" (1.613 m)   Wt 101 lb 12.8 oz (46.2 kg)   BMI 17.75 kg/m²     General appearance: alert and cooperative with exam  HEENT: Head: Normocephalic, no lesions, without obvious abnormality.   Eyes: PERRL, EOMI  Ears: Not obvious deformations or lack of hearing  Neck: no carotid bruit, no JVD  Lungs: clear to auscultation bilaterally  Heart: regular rate and rhythm, S1, S2 normal, no murmur, click, rub or gallop  Abdomen: soft, non-tender; bowel sounds normal; no masses,  no organomegaly  Extremities: extremities normal, atraumatic, no cyanosis or edema  Neurologic: Mental status: Alert, oriented, thought content appropriate  Skin: WNL for age and condition, no obvious rashes or leasions      Meds:    Current Outpatient Medications:     Cholecalciferol (VITAMIN D3 PO), Take by mouth, Disp: , Rfl:     UNABLE TO FIND, Indications: Antonia silver liquid , Disp: , Rfl:     UNABLE TO FIND, Blue cohosh, Disp: , Rfl:     UNABLE TO FIND, Spirulina, Disp: , Rfl:     Omega-3 Fatty Acids (FISH OIL ULTRA PO), Take by mouth, Disp: , Rfl:     Zinc Sulfate (ZINC 15 PO), Take by mouth With copper, Disp: , Rfl:     POTASSIUM GLUCONATE PO, Take by mouth, Disp: , Rfl:     magnesium oxide (MAG-OX) 400 MG tablet, Take 400 mg by mouth daily, Disp: , Rfl:     MISC NATURAL PRODUCT OP, Apply to eye Thyroid support, Disp: , Rfl:     ALFALFA PO, Take by mouth, Disp: , Rfl:     ALOE VERA PO, Take by mouth, Disp: , Rfl: CALCIUM CARBONATE PO, Take 300 mg by mouth daily , Disp: , Rfl:     Vitamins A & D (VITAMIN A & D PO), Take by mouth, Disp: , Rfl:     SELENIUM PO, Take by mouth, Disp: , Rfl:     Wild Yam, Dioscorea villosa, (WILD YAM PO), Take by mouth, Disp: , Rfl:     Misc Natural Products (ADRENAL PO), Take by mouth, Disp: , Rfl:     PROBIOTIC PRODUCT PO, Take by mouth, Disp: , Rfl:     IODINE, KELP, PO, Take by mouth, Disp: , Rfl:     UNABLE TO FIND, Indications: CBD oil  (Patient not taking: Reported on 2/28/2023), Disp: , Rfl:      LABS  No results found for: CHOL, TRIG, HDL, LDLCHOLESTEROL, LDLCALC, LABVLDL, VLDL, CHOLHDLRATIO    No results found for: NA, K, CL, CO2, BUN, CREATININE, GLUCOSE, CALCIUM, PROT, LABALBU, BILITOT, ALKPHOS, AST, ALT, LABGLOM, GFRAA, AGRATIO, GLOB    EKG:  Sinus  Rhythm    First degree A-V block   RSR(V1) -nondiagnostic. Low voltage with rightward P-axis and rotation -possible pulmonary disease. TTE 6/13/2016  1. Normal left ventricular dimension and wall thickness. 2. Normal systolic and diastolic left ventricular function. 3. Dilated right atrium. No atrial shunt noted on color doppler examination. 4. Normal right ventricular size and function. 5. Sclerotic aortic valve. 6. Mild mitral regurgitation. 7. Mild to moderate tricuspid regurgitation. RVSP is 42 mm Hg. 8. No pericardial effusion. Echo 8/20:  Normal left ventricular diameter. Left ventricular systolic function is normal. Left ventricular ejection fraction 65 %. Mitral annular calcification. Mild mitral regurgitation. Moderate tricuspid regurgitation. Estimated right ventricular systolic pressure is 41 mmHg. Mild pulmonary Hypertension. Had scans at [facility] on 5/21: mild R and L Carotid artery stenosis < 50%. Abnormal JOSE LUIS on Right 0.721 and Left 0.63        Assessment and Plan:     HTN- We had recommended norvasc. She will look in to the norvasc before allowing us to start it.    Was considering starting Vitamin E, I advised her that Vit E has been associated with increased all cause mortality. PMID: [id number]  HLP with , . May be due to poorly controlled thyroid, but patient is declining thyroid evaluation and treatment after extensive discussion. PAD with Abnormal JOSE LUIS at a screening. - will order LE JOSE LUIS in the clinic to confirm, then refer to vascular if needed. 1st AV Block- Asymptomatic- stable. Will follow. Bradycardia- stable, chronic   Reviewed Echo 8/20- Mild MR, Mod TR  Abnormal ECG-Previously refused stress testing. Has no symptoms. Will monitor. Mild to moderate mitral regurgitation on echo- declining follow up echoes  Chronic neck and lower back pain. Multiple Allergies. Stable, chronic    Severe hypothyroid- declining any thyroid replacement- per pcp. Last TSH on 11/19 in [facility] 51.67. Has since improved to ~4. Will defer to pcp and patient. Discussed cardiac sequelae. The patient is to continue heart healthy diet, weight loss and exercise as tolerated. Patient's medications and side effects were discussed. Medication refills were provided if needed. Follow up appointment timing was discussed. All questions and concerns were addressed to patient's satisfaction. Thank you for allowing me to participate in the care of this patient, please do not hesitate to call if you have any questions. Angélica Edwards, [id number] [facility]  [facility]. [facility]  [phone]

## 2023-10-23 ENCOUNTER — OFFICE VISIT (OUTPATIENT)
Dept: CARDIOLOGY | Age: 78
End: 2023-10-23
Payer: MEDICARE

## 2023-10-23 VITALS
WEIGHT: 101.6 LBS | BODY MASS INDEX: 17.72 KG/M2 | SYSTOLIC BLOOD PRESSURE: 116 MMHG | HEART RATE: 71 BPM | DIASTOLIC BLOOD PRESSURE: 66 MMHG

## 2023-10-23 DIAGNOSIS — I07.1 MODERATE TRICUSPID INSUFFICIENCY: Primary | ICD-10-CM

## 2023-10-23 DIAGNOSIS — I34.0 MILD MITRAL REGURGITATION: ICD-10-CM

## 2023-10-23 DIAGNOSIS — R00.1 BRADYCARDIA: ICD-10-CM

## 2023-10-23 PROCEDURE — 93005 ELECTROCARDIOGRAM TRACING: CPT | Performed by: NURSE PRACTITIONER

## 2023-10-23 PROCEDURE — 1090F PRES/ABSN URINE INCON ASSESS: CPT | Performed by: NURSE PRACTITIONER

## 2023-10-23 PROCEDURE — G8427 DOCREV CUR MEDS BY ELIG CLIN: HCPCS | Performed by: NURSE PRACTITIONER

## 2023-10-23 PROCEDURE — 1036F TOBACCO NON-USER: CPT | Performed by: NURSE PRACTITIONER

## 2023-10-23 PROCEDURE — 99214 OFFICE O/P EST MOD 30 MIN: CPT | Performed by: NURSE PRACTITIONER

## 2023-10-23 PROCEDURE — G8484 FLU IMMUNIZE NO ADMIN: HCPCS | Performed by: NURSE PRACTITIONER

## 2023-10-23 PROCEDURE — 93010 ELECTROCARDIOGRAM REPORT: CPT | Performed by: NURSE PRACTITIONER

## 2023-10-23 PROCEDURE — G8419 CALC BMI OUT NRM PARAM NOF/U: HCPCS | Performed by: NURSE PRACTITIONER

## 2023-10-23 PROCEDURE — 1123F ACP DISCUSS/DSCN MKR DOCD: CPT | Performed by: NURSE PRACTITIONER

## 2023-10-23 PROCEDURE — G8400 PT W/DXA NO RESULTS DOC: HCPCS | Performed by: NURSE PRACTITIONER

## 2023-10-23 NOTE — PROGRESS NOTES
[facility]    10/23/23    Willow Pike  [id number]  1945    CC: follow up for 1st AVB    HPI:  Willow Pike  Is here for follow up. Denies any cp, sob, orthopnea, pnd, le edema, palpitations. Patient is doing well from a cardiac standpoint. Good functional capacity with no significant change in functional capacity. No chest pain, no dyspnea, no PND, no syncope or pre-syncope, no orthopnea. No symptoms of CHF or angina/chest pain. Past Medical History:   Diagnosis Date    Cancer [facility]) 2003    Basal cell carcinoma    Osteoarthritis     Personal history of kidney stones     Multiple kidney stone [id number]     Past Surgical History:   Procedure Laterality Date    BREAST CYST EXCISION  1975/1982    [address] [place] [address], [facility] SURGERY  1964, 1965, 1969    LITHOTRIPSY Bilateral 1997    MOUTH SURGERY  [phone]    SHOULDER SURGERY  1974    TONSILLECTOMY AND ADENOIDECTOMY  Child    TUBAL LIGATION  1968     Family History   Problem Relation Age of Onset    Arthritis Mother     Depression Mother     High Blood Pressure Mother     Heart Disease Father     High Blood Pressure Father     Heart Disease Sister     Heart Disease Brother     Heart Disease Brother     Heart Disease Brother      REVIEW OF SYSTEMS:    Constitutional: there has been no unanticipated weight loss. There's been No change in energy level, No change in activity level. Eyes: No visual changes or diplopia. No scleral icterus. ENT: No Headaches, hearing loss or vertigo. No mouth sores or sore throat. Cardiovascular: AS HPI  Respiratory: AS HPI  Gastrointestinal: No abdominal pain, appetite loss, blood in stools. No change in bowel or bladder habits. Genitourinary: No dysuria, trouble voiding, or hematuria.   Musculoskeletal:  No gait disturbance, No weakness or joint

## 2024-10-21 ENCOUNTER — OFFICE VISIT (OUTPATIENT)
Dept: CARDIOLOGY | Age: 79
End: 2024-10-21
Payer: MEDICARE

## 2024-10-21 VITALS
HEIGHT: 64 IN | SYSTOLIC BLOOD PRESSURE: 138 MMHG | BODY MASS INDEX: 16.9 KG/M2 | WEIGHT: 99 LBS | HEART RATE: 67 BPM | DIASTOLIC BLOOD PRESSURE: 72 MMHG

## 2024-10-21 DIAGNOSIS — I44.0 1ST DEGREE AV BLOCK: ICD-10-CM

## 2024-10-21 DIAGNOSIS — I10 HYPERTENSION, ESSENTIAL: ICD-10-CM

## 2024-10-21 DIAGNOSIS — I07.1 MODERATE TRICUSPID INSUFFICIENCY: Primary | ICD-10-CM

## 2024-10-21 DIAGNOSIS — R00.1 BRADYCARDIA: ICD-10-CM

## 2024-10-21 DIAGNOSIS — I73.9 CLAUDICATION (HCC): ICD-10-CM

## 2024-10-21 DIAGNOSIS — I34.0 MILD MITRAL REGURGITATION: ICD-10-CM

## 2024-10-21 DIAGNOSIS — R94.31 ABNORMAL ECG: ICD-10-CM

## 2024-10-21 DIAGNOSIS — I73.9 PAD (PERIPHERAL ARTERY DISEASE) (HCC): ICD-10-CM

## 2024-10-21 PROCEDURE — 1123F ACP DISCUSS/DSCN MKR DOCD: CPT | Performed by: INTERNAL MEDICINE

## 2024-10-21 PROCEDURE — G8419 CALC BMI OUT NRM PARAM NOF/U: HCPCS | Performed by: INTERNAL MEDICINE

## 2024-10-21 PROCEDURE — 3078F DIAST BP <80 MM HG: CPT | Performed by: INTERNAL MEDICINE

## 2024-10-21 PROCEDURE — G8484 FLU IMMUNIZE NO ADMIN: HCPCS | Performed by: INTERNAL MEDICINE

## 2024-10-21 PROCEDURE — 1090F PRES/ABSN URINE INCON ASSESS: CPT | Performed by: INTERNAL MEDICINE

## 2024-10-21 PROCEDURE — 1036F TOBACCO NON-USER: CPT | Performed by: INTERNAL MEDICINE

## 2024-10-21 PROCEDURE — 3075F SYST BP GE 130 - 139MM HG: CPT | Performed by: INTERNAL MEDICINE

## 2024-10-21 PROCEDURE — G8427 DOCREV CUR MEDS BY ELIG CLIN: HCPCS | Performed by: INTERNAL MEDICINE

## 2024-10-21 PROCEDURE — 93005 ELECTROCARDIOGRAM TRACING: CPT | Performed by: INTERNAL MEDICINE

## 2024-10-21 PROCEDURE — 99212 OFFICE O/P EST SF 10 MIN: CPT | Performed by: INTERNAL MEDICINE

## 2024-10-21 PROCEDURE — 93010 ELECTROCARDIOGRAM REPORT: CPT | Performed by: INTERNAL MEDICINE

## 2024-10-21 PROCEDURE — 99214 OFFICE O/P EST MOD 30 MIN: CPT | Performed by: INTERNAL MEDICINE

## 2024-10-21 PROCEDURE — G8400 PT W/DXA NO RESULTS DOC: HCPCS | Performed by: INTERNAL MEDICINE

## 2024-10-21 NOTE — PROGRESS NOTES
[facility]    10/21/24    Lisset Sinclair  [id number]  1945    CC: follow up for 1st AVB    HPI:  Lisset Sinclair  Is here for follow up.   Has some stress.   No cp.   No sob.   No palpitations.   No le edema.     Past Medical History:   Diagnosis Date    Cancer ([facility]) 2003    Basal cell carcinoma    Osteoarthritis     Personal history of kidney stones     Multiple kidney stone [id number]     Past Surgical History:   Procedure Laterality Date    BREAST CYST EXCISION  1975/1982    COLONOSCOPY  1986    COLONOSCOPY  1989    COLONOSCOPY  1997    DILATION AND CURETTAGE OF UTERUS  1966, 1968,1984,1988,1993    HEMORRHOID SURGERY  1986    KIDNEY STONE SURGERY  1964, 1965, 1969    LITHOTRIPSY Bilateral 1997    MOUTH SURGERY  [phone]    SHOULDER SURGERY  1974    TONSILLECTOMY AND ADENOIDECTOMY  Child    TUBAL LIGATION  1968     Family History   Problem Relation Age of Onset    Arthritis Mother     Depression Mother     High Blood Pressure Mother     Heart Disease Father     High Blood Pressure Father     Heart Disease Sister     Heart Disease Brother     Heart Disease Brother     Heart Disease Brother      REVIEW OF SYSTEMS:    Constitutional: there has been no unanticipated weight loss. There's been No change in energy level, No change in activity level.     Eyes: No visual changes or diplopia. No scleral icterus.  ENT: No Headaches, hearing loss or vertigo. No mouth sores or sore throat.  Cardiovascular: AS HPI  Respiratory: AS HPI  Gastrointestinal: No abdominal pain, appetite loss, blood in stools. No change in bowel or bladder habits.  Genitourinary: No dysuria, trouble voiding, or hematuria.  Musculoskeletal:  No gait disturbance, No weakness or joint complaints.  Integumentary: No rash or pruritis.  Neurological: No headache, diplopia, change in muscle strength, numbness or tingling. No change in gait, balance, coordination, mood, affect, memory, mentation, behavior.  Psychiatric: No

## 2024-12-03 ENCOUNTER — TELEPHONE (OUTPATIENT)
Dept: SURGERY | Age: 79
End: 2024-12-03

## 2024-12-03 PROBLEM — M81.0 OSTEOPOROSIS: Status: ACTIVE | Noted: 2017-01-19

## 2024-12-03 PROBLEM — M48.061 SPINAL STENOSIS OF LUMBAR REGION: Status: ACTIVE | Noted: 2017-01-19

## 2024-12-03 PROBLEM — I49.8 OTHER SPECIFIED CARDIAC ARRHYTHMIAS: Status: ACTIVE | Noted: 2017-01-19

## 2024-12-03 PROBLEM — K80.20 CALCULUS OF GALLBLADDER: Status: ACTIVE | Noted: 2017-01-19

## 2024-12-03 PROBLEM — R63.6 UNDERWEIGHT: Status: ACTIVE | Noted: 2017-01-19

## 2024-12-03 PROBLEM — I36.1 NON-RHEUMATIC TRICUSPID VALVE INSUFFICIENCY: Status: ACTIVE | Noted: 2017-01-19

## 2024-12-03 PROBLEM — M17.9 OSTEOARTHRITIS OF KNEE: Status: ACTIVE | Noted: 2017-01-19

## 2024-12-03 PROBLEM — R94.31 ABNORMAL ELECTROCARDIOGRAPHY: Status: ACTIVE | Noted: 2017-01-19

## 2024-12-03 PROBLEM — I27.20 PULMONARY HYPERTENSION (HCC): Status: ACTIVE | Noted: 2017-01-19

## 2024-12-03 PROBLEM — E03.9 HYPOTHYROIDISM: Status: ACTIVE | Noted: 2017-01-19

## 2025-08-14 ENCOUNTER — TELEPHONE (OUTPATIENT)
Dept: SURGERY | Age: 80
End: 2025-08-14

## 2025-08-14 PROBLEM — K62.5 RECTAL BLEEDING: Status: ACTIVE | Noted: 2025-08-14

## 2025-08-18 ENCOUNTER — TELEPHONE (OUTPATIENT)
Dept: SURGERY | Age: 80
End: 2025-08-18

## 2025-08-18 RX ORDER — POLYETHYLENE GLYCOL 3350, SODIUM SULFATE ANHYDROUS, SODIUM BICARBONATE, SODIUM CHLORIDE, POTASSIUM CHLORIDE 236; 22.74; 6.74; 5.86; 2.97 G/4L; G/4L; G/4L; G/4L; G/4L
4 POWDER, FOR SOLUTION ORAL ONCE
Qty: 4000 ML | Refills: 0 | Status: SHIPPED | OUTPATIENT
Start: 2025-08-18 | End: 2025-08-18

## 2025-08-26 RX ORDER — ARNICA MONTANA 1 [HP_X]/G
GEL TOPICAL
COMMUNITY

## 2025-08-26 RX ORDER — POVIDONE 25 MG/5ML
SOLUTION/ DROPS OPHTHALMIC
COMMUNITY

## 2025-08-26 RX ORDER — MULTIVIT WITH MINERALS/LUTEIN
400 TABLET ORAL DAILY
COMMUNITY

## 2025-09-02 ENCOUNTER — ANESTHESIA (OUTPATIENT)
Dept: OPERATING ROOM | Age: 80
End: 2025-09-02
Payer: MEDICARE

## 2025-09-02 ENCOUNTER — ANESTHESIA EVENT (OUTPATIENT)
Dept: OPERATING ROOM | Age: 80
End: 2025-09-02
Payer: MEDICARE

## 2025-09-02 ENCOUNTER — HOSPITAL ENCOUNTER (OUTPATIENT)
Age: 80
Setting detail: OUTPATIENT SURGERY
Discharge: HOME | End: 2025-09-02
Attending: SURGERY | Admitting: SURGERY
Payer: MEDICARE

## 2025-09-02 ENCOUNTER — APPOINTMENT (OUTPATIENT)
Dept: GENERAL RADIOLOGY | Age: 80
End: 2025-09-02
Attending: SURGERY
Payer: MEDICARE

## 2025-09-02 VITALS
TEMPERATURE: 97.4 F | HEART RATE: 55 BPM | OXYGEN SATURATION: 100 % | SYSTOLIC BLOOD PRESSURE: 168 MMHG | RESPIRATION RATE: 16 BRPM | HEIGHT: 64 IN | BODY MASS INDEX: 16.25 KG/M2 | DIASTOLIC BLOOD PRESSURE: 79 MMHG | WEIGHT: 95.2 LBS

## 2025-09-02 DIAGNOSIS — K62.5 RECTAL BLEEDING: ICD-10-CM

## 2025-09-02 PROCEDURE — 6360000002 HC RX W HCPCS: Performed by: NURSE ANESTHETIST, CERTIFIED REGISTERED

## 2025-09-02 PROCEDURE — 2709999900 HC NON-CHARGEABLE SUPPLY: Performed by: SURGERY

## 2025-09-02 PROCEDURE — 3700000001 HC ADD 15 MINUTES (ANESTHESIA): Performed by: SURGERY

## 2025-09-02 PROCEDURE — 3700000000 HC ANESTHESIA ATTENDED CARE: Performed by: SURGERY

## 2025-09-02 PROCEDURE — 2580000003 HC RX 258: Performed by: SURGERY

## 2025-09-02 PROCEDURE — C1713 ANCHOR/SCREW BN/BN,TIS/BN: HCPCS | Performed by: SURGERY

## 2025-09-02 PROCEDURE — 88342 IMHCHEM/IMCYTCHM 1ST ANTB: CPT

## 2025-09-02 PROCEDURE — 7100000011 HC PHASE II RECOVERY - ADDTL 15 MIN: Performed by: SURGERY

## 2025-09-02 PROCEDURE — 7100000010 HC PHASE II RECOVERY - FIRST 15 MIN: Performed by: SURGERY

## 2025-09-02 PROCEDURE — 88341 IMHCHEM/IMCYTCHM EA ADD ANTB: CPT

## 2025-09-02 PROCEDURE — 3609010600 HC COLONOSCOPY POLYPECTOMY SNARE/COLD BIOPSY: Performed by: SURGERY

## 2025-09-02 PROCEDURE — 74019 RADEX ABDOMEN 2 VIEWS: CPT

## 2025-09-02 PROCEDURE — 88305 TISSUE EXAM BY PATHOLOGIST: CPT

## 2025-09-02 RX ORDER — SODIUM CHLORIDE, SODIUM LACTATE, POTASSIUM CHLORIDE, CALCIUM CHLORIDE 600; 310; 30; 20 MG/100ML; MG/100ML; MG/100ML; MG/100ML
INJECTION, SOLUTION INTRAVENOUS CONTINUOUS
Status: DISCONTINUED | OUTPATIENT
Start: 2025-09-02 | End: 2025-09-03 | Stop reason: HOSPADM

## 2025-09-02 RX ORDER — SODIUM CHLORIDE 0.9 % (FLUSH) 0.9 %
5-40 SYRINGE (ML) INJECTION PRN
Status: DISCONTINUED | OUTPATIENT
Start: 2025-09-02 | End: 2025-09-03 | Stop reason: HOSPADM

## 2025-09-02 RX ORDER — SODIUM CHLORIDE 9 MG/ML
25 INJECTION, SOLUTION INTRAVENOUS PRN
Status: DISCONTINUED | OUTPATIENT
Start: 2025-09-02 | End: 2025-09-03 | Stop reason: HOSPADM

## 2025-09-02 RX ORDER — LIDOCAINE HYDROCHLORIDE 40 MG/ML
INJECTION, SOLUTION RETROBULBAR
Status: DISCONTINUED | OUTPATIENT
Start: 2025-09-02 | End: 2025-09-02 | Stop reason: SDUPTHER

## 2025-09-02 RX ORDER — SODIUM CHLORIDE 0.9 % (FLUSH) 0.9 %
5-40 SYRINGE (ML) INJECTION EVERY 12 HOURS SCHEDULED
Status: DISCONTINUED | OUTPATIENT
Start: 2025-09-02 | End: 2025-09-03 | Stop reason: HOSPADM

## 2025-09-02 RX ORDER — PROPOFOL 10 MG/ML
INJECTION, EMULSION INTRAVENOUS
Status: DISCONTINUED | OUTPATIENT
Start: 2025-09-02 | End: 2025-09-02 | Stop reason: SDUPTHER

## 2025-09-02 RX ADMIN — PROPOFOL 120 MCG/KG/MIN: 10 INJECTION, EMULSION INTRAVENOUS at 12:35

## 2025-09-02 RX ADMIN — SODIUM CHLORIDE, POTASSIUM CHLORIDE, SODIUM LACTATE AND CALCIUM CHLORIDE: 600; 310; 30; 20 INJECTION, SOLUTION INTRAVENOUS at 11:32

## 2025-09-02 RX ADMIN — PROPOFOL 100 MG: 10 INJECTION, EMULSION INTRAVENOUS at 12:34

## 2025-09-02 RX ADMIN — LIDOCAINE HYDROCHLORIDE 40 MG: 40 INJECTION, SOLUTION RETROBULBAR; TOPICAL at 12:34

## 2025-09-02 ASSESSMENT — PAIN DESCRIPTION - DESCRIPTORS
DESCRIPTORS: PRESSURE
DESCRIPTORS: PRESSURE
DESCRIPTORS: CRAMPING

## 2025-09-02 ASSESSMENT — PAIN SCALES - GENERAL
PAINLEVEL_OUTOF10: 9
PAINLEVEL_OUTOF10: 2
PAINLEVEL_OUTOF10: 4

## 2025-09-02 ASSESSMENT — PAIN DESCRIPTION - LOCATION
LOCATION: OTHER (COMMENT)
LOCATION: ABDOMEN

## 2025-09-02 ASSESSMENT — PAIN - FUNCTIONAL ASSESSMENT
PAIN_FUNCTIONAL_ASSESSMENT: 0-10
PAIN_FUNCTIONAL_ASSESSMENT: ADULT NONVERBAL PAIN SCALE (NPVS)

## 2025-09-02 ASSESSMENT — PAIN DESCRIPTION - PAIN TYPE
TYPE: ACUTE PAIN
TYPE: OTHER (COMMENT)

## 2025-09-03 ENCOUNTER — TELEPHONE (OUTPATIENT)
Dept: SURGERY | Age: 80
End: 2025-09-03

## 2025-09-05 LAB — SURGICAL PATHOLOGY REPORT: NORMAL

## (undated) DEVICE — FORCEPS BX L240CM JAW DIA2.2MM RAD JAW 4 HOT DISP

## (undated) DEVICE — PAD ES 15SQIN UNIV SPLNT PREATTACH CRD GRND W/ SFTY RNG

## (undated) DEVICE — TRAP SURG QUAD PARABOLA SLOT DSGN SFTY SCRN TRAPEASE

## (undated) DEVICE — CANNULA NSL SUPERSOFT 7 FT CO2 SAMPLIG O2 TBNG

## (undated) DEVICE — MANIFOLD SUCT 4 PRT 2 CANSTR FLTR DISP NEPTUNE 2

## (undated) DEVICE — Device

## (undated) DEVICE — NEEDLE ENDOSCP 25GA L4MM WRK L230CM MIN CHN 28MM UP GI MID

## (undated) DEVICE — SNARE ENDOSCP L240CM SHTH DIA2.4MM LOOP W20MM MIN WRK CHN

## (undated) DEVICE — MARKER ENDOSCP TISS TATTOO C BLK SUSP PREFIL PREASSEMBLED